# Patient Record
Sex: MALE | Race: NATIVE HAWAIIAN OR OTHER PACIFIC ISLANDER
[De-identification: names, ages, dates, MRNs, and addresses within clinical notes are randomized per-mention and may not be internally consistent; named-entity substitution may affect disease eponyms.]

---

## 2018-12-17 ENCOUNTER — HOSPITAL ENCOUNTER (OUTPATIENT)
Dept: HOSPITAL 31 - C.SDS | Age: 48
Discharge: HOME | End: 2018-12-17
Attending: UROLOGY
Payer: SELF-PAY

## 2018-12-17 VITALS
DIASTOLIC BLOOD PRESSURE: 75 MMHG | OXYGEN SATURATION: 98 % | HEART RATE: 85 BPM | RESPIRATION RATE: 16 BRPM | TEMPERATURE: 97.4 F | SYSTOLIC BLOOD PRESSURE: 142 MMHG

## 2018-12-17 VITALS — BODY MASS INDEX: 22.4 KG/M2

## 2018-12-17 DIAGNOSIS — N20.1: Primary | ICD-10-CM
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
## 2018-12-17 DIAGNOSIS — T83.89XA: ICD-10-CM

## 2018-12-17 PROCEDURE — 88300 SURGICAL PATH GROSS: CPT

## 2018-12-17 PROCEDURE — 52356 CYSTO/URETERO W/LITHOTRIPSY: CPT

## 2018-12-17 PROCEDURE — 82355 CALCULUS ANALYSIS QUAL: CPT

## 2018-12-17 PROCEDURE — 82365 CALCULUS SPECTROSCOPY: CPT

## 2018-12-17 PROCEDURE — 87086 URINE CULTURE/COLONY COUNT: CPT

## 2018-12-17 PROCEDURE — 74019 RADEX ABDOMEN 2 VIEWS: CPT

## 2018-12-17 PROCEDURE — 82948 REAGENT STRIP/BLOOD GLUCOSE: CPT

## 2018-12-17 NOTE — RAD
Date of service: 



12/17/2018



PROCEDURE:  Fluoroscopy up to 1 hr



HISTORY:

LT. UROLITHIASIS



COMPARISON:





TECHNIQUE:

Fluoroscopy was provided in the operating room.  59.3 sec of fluoro 

time.  Cumulative dose 0.19397 mGy.  Ten images were submitted



FINDINGS:

The study shows placement of wires and instruments in the left ureter 

with placement of a left ureteral stent



IMPRESSION:

As above

## 2018-12-17 NOTE — PCM.SURG1
Surgeon's Initial Post Op Note





- Surgeon's Notes


Surgeon: NOEMI Brizuela


Assistant: none


Type of Anesthesia: General LMA


Pre-Operative Diagnosis: Urolithiasis


Operative Findings: Retained, encrusted, and obstructed L ureteral stent.  BPH. 

Cystitis


Post-Operative Diagnosis: same


Operation Performed: cystoscopy, attempted removal of L ureteral stent.  L 

ureterorenoscopy, laser lithotripsy.  RTG pyelogram.  Stent insertion.  EUA


Specimen/Specimens Removed: urine.  stone.  stent


Estimated Blood Loss: EBL {In ML}: 20


Blood Products Given: N/A


Post-Op Condition: Good


Date of Surgery/Procedure: 12/17/18


Time of Surgery/Procedure: 11:50

## 2018-12-17 NOTE — RAD
Date of service: 



12/17/2018



HISTORY:

 LT. UROLITHIASIS 



COMPARISON:

None available.



FINDINGS:



BOWEL:

Normal. No obstruction. No free air. 



BONES:

Normal.



OTHER FINDINGS:

Left ureteral stent in place.  There are no stones seen along side 

the stent



IMPRESSION:

No active disease.

## 2018-12-18 NOTE — OP
PROCEDURE DATE:  12/17/2018



PREOPERATIVE DIAGNOSES:  Urolithiasis, left ureteral stent.



POSTOPERATIVE DIAGNOSES:  Urolithiasis, left ureteral stent, retained

obstructed and encrusted left ureteral stent.



PROCEDURES:

1.  Cystoscopy.

2.  Attempted removal of left ureteral stent.

3.  Left ureterorenoscopy.

4.  Laser lithotripsy.

5.  Removal of left ureteral stent.

6.  Left retrograde pyelogram.

7.  Insertion of left ureteral stent.



OPERATING SURGEON:  Rohini Brizuela MD



DESCRIPTION OF PROCEDURE:  Procedure is as follows:  The patient received

perioperative antibiotics.  The patient was placed in the lithotomy

position.   film of the abdomen was obtained.



The genitalia were prepped and draped sterilely.



A 22-Palestinian cystoscope sheath was introduced under direct vision.  Urethra,

prostate and bladder were inspected.



FINDINGS:  There was no stricture in the anterior urethra.  There was

evidence of lateral lobe prostatic hypertrophy.  There was evidence of

cystitis.



The left ureteral stent was identified.  Stent was encrusted.  The stent

was grasped with rigid grasping forceps.  The stent was attempted to be

removed; however, the stent could not be removed due to proximal

encrustation.



The stent was able to be brought to the level of urethral meatus.



Attempted passage of the multiple guidewires to the stent was performed. 

However, the stent was obstructed as well.



The cystoscope was re-introduced.  A 0.035-inch guidewire was inserted into

the ureteral orifice and passed up to the level of the kidney.  A second

guidewire was inserted as well.



Ureteroscopy was performed after the cystoscope was removed.  A 7-Palestinian

rigid ureteroscope was passed over the working wire.  Under direct vision,

under video endoscopic control as well as under fluoroscopic control, the

ureteroscope was advanced proximally adjacent to the stent.



The stent was noted to be markedly encrusted.



Laser lithotripsy was performed.  The stent was freed from its encrustation

using the holmium laser and 365-micron fiber and using the dusting mode. 

There was excellent fragmentation.  The stent was free of fragments.  The

fragments were reduced to semi particles and small particles.



The stent was then able to be removed.



An open-ended catheter was inserted over the remaining guidewire, after the

ureteroscope was removed under direct vision.  The ureteral mucosa was

intact, without trauma.



Iodinated-contrast dye was instilled through the open-ended catheter.  The

pyelogram demonstrated the renal calyceal anatomy.



A 6-Palestinian multi-length stent was inserted over the guidewire.  Proper

stent position was confirmed with fluoroscopy and endoscopy.  The guidewire

was removed.  The stent was left in place.



A distal suture was left to exit per urethra from the stent.



Rectal examination was performed.  Prostate was supple and smooth

approximately 35 g size, without fixation, induration or nodularity.



The patient tolerated the procedure without complication.







__________________________________________

Rohini Brizuela MD





DD:  12/17/2018 19:26:11

DT:  12/17/2018 19:29:19

Job # [id number]